# Patient Record
Sex: FEMALE | Race: WHITE | NOT HISPANIC OR LATINO | URBAN - METROPOLITAN AREA
[De-identification: names, ages, dates, MRNs, and addresses within clinical notes are randomized per-mention and may not be internally consistent; named-entity substitution may affect disease eponyms.]

---

## 2020-07-25 ENCOUNTER — TELEPHONE ENCOUNTER (OUTPATIENT)
Dept: URBAN - METROPOLITAN AREA CLINIC 13 | Facility: CLINIC | Age: 60
End: 2020-07-25

## 2020-07-26 ENCOUNTER — TELEPHONE ENCOUNTER (OUTPATIENT)
Dept: URBAN - METROPOLITAN AREA CLINIC 13 | Facility: CLINIC | Age: 60
End: 2020-07-26

## 2020-07-26 RX ORDER — CHLORDIAZEPOXIDE HYDROCHLORIDE 5 MG/1
TAKE FOUR 2.5 MG TABLETS DAILY CAPSULE ORAL
Refills: 0 | Status: ACTIVE | COMMUNITY

## 2020-07-26 RX ORDER — TRAZODONE HYDROCHLORIDE 50 MG/1
TAKE 1 TABLET DAILY TABLET ORAL
Qty: 30 | Refills: 1 | Status: ACTIVE | COMMUNITY

## 2020-07-26 RX ORDER — ALPRAZOLAM 1 MG
TAKE 2 TABLET BEDTIME TABLET ORAL
Qty: 60 | Refills: 0 | Status: ACTIVE | COMMUNITY

## 2020-07-26 RX ORDER — AMLODIPINE BESYLATE 5 MG
TAKE 1 TABLET DAILY TABLET ORAL
Refills: 0 | Status: ACTIVE | COMMUNITY

## 2020-07-26 RX ORDER — FLUOXETINE HCL 20 MG
CAPSULE ORAL
Refills: 0 | Status: ACTIVE | COMMUNITY

## 2020-07-26 RX ORDER — DIPHENOXYLATE HCL/ATROPINE 2.5-.025MG
TABLET ORAL
Refills: 0 | Status: ACTIVE | COMMUNITY

## 2020-07-26 RX ORDER — BUSPIRONE HYDROCHLORIDE 10 MG/1
TAKE 1 TABLET BY MOUTH ONCE DAILY TABLET ORAL
Qty: 30 | Refills: 1 | Status: ACTIVE | COMMUNITY

## 2021-07-16 ENCOUNTER — WEB ENCOUNTER (OUTPATIENT)
Dept: URBAN - METROPOLITAN AREA CLINIC 113 | Facility: CLINIC | Age: 61
End: 2021-07-16

## 2021-07-16 ENCOUNTER — OFFICE VISIT (OUTPATIENT)
Dept: URBAN - METROPOLITAN AREA CLINIC 113 | Facility: CLINIC | Age: 61
End: 2021-07-16
Payer: COMMERCIAL

## 2021-07-16 VITALS
WEIGHT: 89 LBS | SYSTOLIC BLOOD PRESSURE: 112 MMHG | HEIGHT: 60 IN | HEART RATE: 108 BPM | TEMPERATURE: 97.7 F | BODY MASS INDEX: 17.47 KG/M2 | DIASTOLIC BLOOD PRESSURE: 79 MMHG

## 2021-07-16 DIAGNOSIS — R10.13 EPIGASTRIC ABDOMINAL PAIN: ICD-10-CM

## 2021-07-16 DIAGNOSIS — K59.09 CHRONIC CONSTIPATION: ICD-10-CM

## 2021-07-16 DIAGNOSIS — R93.5 ABNORMAL ABDOMINAL CT SCAN: ICD-10-CM

## 2021-07-16 PROCEDURE — 99203 OFFICE O/P NEW LOW 30 MIN: CPT | Performed by: INTERNAL MEDICINE

## 2021-07-16 RX ORDER — ALPRAZOLAM 1 MG
TAKE 2 TABLET BEDTIME TABLET ORAL
Qty: 60 | Refills: 0 | Status: ON HOLD | COMMUNITY

## 2021-07-16 RX ORDER — BUSPIRONE HYDROCHLORIDE 10 MG/1
TAKE 1 TABLET BY MOUTH ONCE DAILY TABLET ORAL
Qty: 30 | Refills: 1 | Status: ON HOLD | COMMUNITY

## 2021-07-16 RX ORDER — FLUOXETINE HCL 20 MG
CAPSULE ORAL
Refills: 0 | Status: ON HOLD | COMMUNITY

## 2021-07-16 RX ORDER — AMLODIPINE BESYLATE 5 MG
TAKE 1 TABLET DAILY TABLET ORAL
Refills: 0 | Status: ON HOLD | COMMUNITY

## 2021-07-16 RX ORDER — CHLORDIAZEPOXIDE HYDROCHLORIDE AND CLIDINIUM BROMIDE 5; 2.5 MG/1; MG/1
1 CAPSULE BEFORE MEALS CAPSULE ORAL THREE TIMES A DAY
Status: ACTIVE | COMMUNITY

## 2021-07-16 RX ORDER — CHLORDIAZEPOXIDE HYDROCHLORIDE 5 MG/1
TAKE FOUR 2.5 MG TABLETS DAILY CAPSULE ORAL
Refills: 0 | Status: ON HOLD | COMMUNITY

## 2021-07-16 RX ORDER — DIPHENOXYLATE HCL/ATROPINE 2.5-.025MG
TABLET ORAL
Refills: 0 | Status: ACTIVE | COMMUNITY

## 2021-07-16 RX ORDER — TRAZODONE HYDROCHLORIDE 50 MG/1
TAKE 1 TABLET DAILY TABLET ORAL
Qty: 30 | Refills: 1 | Status: ON HOLD | COMMUNITY

## 2021-07-16 NOTE — HPI-TODAY'S VISIT:
61-year-old female with a history of hypertension, anxiety and depression, chronic diarrhea with fecal incontinence, referred by Dr. Joshua Mondragon for evaluation of abdominal pain, abnormal CT findings. She was last seen 6/11/2018 for follow-up after colonoscopy, with complaint of alternating bowel habits suspected to be related to constipation, aggravated by chronic Lomotil use.  She was instructed to discontinue the antidiarrheal and begin a daily fiber supplement. Screening colonoscopy 5/25/2018 was normal. Last Saturday, 6 days ago, she experienced the sudden onset of epigastric pain, which persisted for several days. She describes the discomfort as "constant," but cannot otherwise characterize the pain. Three days ago, she was seen in the ED at [facility] at the urging of her PCP. Imaging at that time reportedly showed constipation, and her "stomach not emptying," per the patient. The referral suggests thickening of the pylorus, but unfortunately imaging results are not available for review. No intervention was taken. She was encouraged a bland diet, and referred to our office. Yesterday, she has 4-5 loose stools and the abdominal pain subsided. She denies nausea or vomiting. Interestingly, she takes Lomotil two tablets twice daily and only has 1-2 bowel movements per week. She denies blood per rectum. She takes Librax as needed, which is helpful. She has unintentionally lose 10lb in the last week.

## 2021-07-19 ENCOUNTER — LAB OUTSIDE AN ENCOUNTER (OUTPATIENT)
Dept: URBAN - METROPOLITAN AREA CLINIC 113 | Facility: CLINIC | Age: 61
End: 2021-07-19

## 2021-07-19 ENCOUNTER — TELEPHONE ENCOUNTER (OUTPATIENT)
Dept: URBAN - METROPOLITAN AREA CLINIC 113 | Facility: CLINIC | Age: 61
End: 2021-07-19

## 2021-08-04 ENCOUNTER — TELEPHONE ENCOUNTER (OUTPATIENT)
Dept: URBAN - METROPOLITAN AREA CLINIC 113 | Facility: CLINIC | Age: 61
End: 2021-08-04

## 2021-08-10 ENCOUNTER — OFFICE VISIT (OUTPATIENT)
Dept: URBAN - METROPOLITAN AREA SURGERY CENTER 25 | Facility: SURGERY CENTER | Age: 61
End: 2021-08-10
Payer: COMMERCIAL

## 2021-08-10 DIAGNOSIS — K29.50 ANTRAL GASTRITIS: ICD-10-CM

## 2021-08-10 DIAGNOSIS — K22.8 OTHER SPECIFIED DISEASES OF ESOPHAGUS: ICD-10-CM

## 2021-08-10 DIAGNOSIS — K31.89 GASTRIC FOVEOLAR HYPERPLASIA: ICD-10-CM

## 2021-08-10 PROCEDURE — G8907 PT DOC NO EVENTS ON DISCHARG: HCPCS | Performed by: INTERNAL MEDICINE

## 2021-08-10 PROCEDURE — 43239 EGD BIOPSY SINGLE/MULTIPLE: CPT | Performed by: INTERNAL MEDICINE

## 2021-08-10 RX ORDER — AMLODIPINE BESYLATE 5 MG
TAKE 1 TABLET DAILY TABLET ORAL
Refills: 0 | Status: ON HOLD | COMMUNITY

## 2021-08-10 RX ORDER — CHLORDIAZEPOXIDE HYDROCHLORIDE 5 MG/1
TAKE FOUR 2.5 MG TABLETS DAILY CAPSULE ORAL
Refills: 0 | Status: ON HOLD | COMMUNITY

## 2021-08-10 RX ORDER — DIPHENOXYLATE HCL/ATROPINE 2.5-.025MG
TABLET ORAL
Refills: 0 | Status: ACTIVE | COMMUNITY

## 2021-08-10 RX ORDER — TRAZODONE HYDROCHLORIDE 50 MG/1
TAKE 1 TABLET DAILY TABLET ORAL
Qty: 30 | Refills: 1 | Status: ON HOLD | COMMUNITY

## 2021-08-10 RX ORDER — CHLORDIAZEPOXIDE HYDROCHLORIDE AND CLIDINIUM BROMIDE 5; 2.5 MG/1; MG/1
1 CAPSULE BEFORE MEALS CAPSULE ORAL THREE TIMES A DAY
Status: ACTIVE | COMMUNITY

## 2021-08-10 RX ORDER — ALPRAZOLAM 1 MG
TAKE 2 TABLET BEDTIME TABLET ORAL
Qty: 60 | Refills: 0 | Status: ON HOLD | COMMUNITY

## 2021-08-10 RX ORDER — FLUOXETINE HCL 20 MG
CAPSULE ORAL
Refills: 0 | Status: ON HOLD | COMMUNITY

## 2021-08-10 RX ORDER — BUSPIRONE HYDROCHLORIDE 10 MG/1
TAKE 1 TABLET BY MOUTH ONCE DAILY TABLET ORAL
Qty: 30 | Refills: 1 | Status: ON HOLD | COMMUNITY

## 2021-08-17 ENCOUNTER — TELEPHONE ENCOUNTER (OUTPATIENT)
Dept: URBAN - METROPOLITAN AREA CLINIC 113 | Facility: CLINIC | Age: 61
End: 2021-08-17

## 2021-08-17 RX ORDER — PANTOPRAZOLE SODIUM 40 MG/1
1 TABLET TABLET, DELAYED RELEASE ORAL ONCE A DAY
Qty: 30 | OUTPATIENT
Start: 2021-08-25

## 2021-09-27 ENCOUNTER — OFFICE VISIT (OUTPATIENT)
Dept: URBAN - METROPOLITAN AREA CLINIC 113 | Facility: CLINIC | Age: 61
End: 2021-09-27
Payer: COMMERCIAL

## 2021-09-27 ENCOUNTER — DASHBOARD ENCOUNTERS (OUTPATIENT)
Age: 61
End: 2021-09-27

## 2021-09-27 VITALS
DIASTOLIC BLOOD PRESSURE: 100 MMHG | HEART RATE: 89 BPM | TEMPERATURE: 98.1 F | SYSTOLIC BLOOD PRESSURE: 165 MMHG | BODY MASS INDEX: 16.88 KG/M2 | WEIGHT: 86 LBS | RESPIRATION RATE: 18 BRPM | HEIGHT: 60 IN

## 2021-09-27 DIAGNOSIS — R10.13 EPIGASTRIC ABDOMINAL PAIN: ICD-10-CM

## 2021-09-27 DIAGNOSIS — K31.89 INTESTINAL METAPLASIA OF GASTRIC MUCOSA: ICD-10-CM

## 2021-09-27 DIAGNOSIS — R63.4 WEIGHT LOSS: ICD-10-CM

## 2021-09-27 DIAGNOSIS — K52.9 CHRONIC DIARRHEA: ICD-10-CM

## 2021-09-27 DIAGNOSIS — K59.01 SLOW TRANSIT CONSTIPATION: ICD-10-CM

## 2021-09-27 PROBLEM — 35298007 SLOW TRANSIT CONSTIPATION: Status: ACTIVE | Noted: 2021-07-16

## 2021-09-27 PROBLEM — 89362005: Status: ACTIVE | Noted: 2021-09-27

## 2021-09-27 PROBLEM — 72519002: Status: ACTIVE | Noted: 2021-09-27

## 2021-09-27 PROBLEM — 236071009: Status: ACTIVE | Noted: 2021-09-27

## 2021-09-27 PROBLEM — 79922009 EPIGASTRIC PAIN: Status: ACTIVE | Noted: 2021-09-27

## 2021-09-27 PROCEDURE — 99213 OFFICE O/P EST LOW 20 MIN: CPT | Performed by: INTERNAL MEDICINE

## 2021-09-27 RX ORDER — AMLODIPINE BESYLATE 5 MG
1 TABLET TABLET ORAL ONCE A DAY
Refills: 0 | Status: DISCONTINUED | COMMUNITY

## 2021-09-27 RX ORDER — CHLORDIAZEPOXIDE HYDROCHLORIDE AND CLIDINIUM BROMIDE 5; 2.5 MG/1; MG/1
1 CAPSULE BEFORE MEALS CAPSULE ORAL
Status: DISCONTINUED | COMMUNITY

## 2021-09-27 RX ORDER — TRAZODONE HYDROCHLORIDE 50 MG/1
TAKE 1 TABLET DAILY TABLET ORAL
Qty: 30 | Refills: 1 | Status: DISCONTINUED | COMMUNITY

## 2021-09-27 RX ORDER — CHLORDIAZEPOXIDE HYDROCHLORIDE 5 MG/1
TAKE FOUR 2.5 MG TABLETS DAILY CAPSULE ORAL
Refills: 0 | Status: DISCONTINUED | COMMUNITY

## 2021-09-27 RX ORDER — DIPHENOXYLATE HCL/ATROPINE 2.5-.025MG
1 TABLET AS NEEDED TABLET ORAL
Refills: 0 | Status: ACTIVE | COMMUNITY

## 2021-09-27 RX ORDER — BUSPIRONE HYDROCHLORIDE 10 MG/1
TAKE 1 TABLET BY MOUTH ONCE DAILY TABLET ORAL
Qty: 30 | Refills: 1 | Status: DISCONTINUED | COMMUNITY

## 2021-09-27 RX ORDER — ALPRAZOLAM 1 MG
TAKE 2 TABLET BEDTIME TABLET ORAL
Qty: 60 | Refills: 0 | Status: DISCONTINUED | COMMUNITY

## 2021-09-27 RX ORDER — FLUOXETINE HCL 20 MG
1 CAPSULE CAPSULE ORAL ONCE A DAY
Refills: 0 | Status: DISCONTINUED | COMMUNITY

## 2021-09-27 RX ORDER — PANTOPRAZOLE SODIUM 40 MG/1
1 TABLET TABLET, DELAYED RELEASE ORAL ONCE A DAY
Qty: 30 | Status: ON HOLD | COMMUNITY
Start: 2021-08-25

## 2021-09-27 NOTE — HPI-TODAY'S VISIT:
This is a 61-year-old female with a history of hypertension, anxiety and depression, chronic diarrhea with fecal incontinence, abdominal pain, and an abnormal CT scan demonstrating a possible small bowel obstruction or abnormality within the stomach presenting for follow-up. She was initially seen 7/16/2021.  6 days prior to her visit, she experienced sudden onset epigastric pain which persisted for several days.  She had been to the emergency department at [facility] reporting that imaging showed constipation and that her stomach was not emptying.  The referral information suggested thickening of the pylorus.  She had 4 or 5 loose stools a day prior to her visit and her abdominal pain had resolved.  She reported that she took Lomotil 2 tablets twice a day and had 1 or 2 bowel movements per week.  She was taking Librax as needed.  She reported a 10 pound unintentional weight loss.  It was suspected that underlying constipation and long-term Lomotil use was playing a role with her symptoms.  She was instructed to discontinue Lomotil and use MiraLAX as needed.  CT report was requested.  She was later scheduled for an EGD.  She has been taking pantoprazole 40 mg daily.  She exhausted her supply 2 days ago.  She reports difficulty sleeping, nightmares, worsening headaches, and describes hot flashes occurring recently.  She attributes this to medication side effect from pantoprazole.  She denies dysphagia, regurgitation, heartburn, or abdominal pain.  She reports a good appetite.  She is concerned regarding persistent weight loss.  She was prescribed Megace in the past which was somewhat helpful.  She did not start MiraLAX and continues to take Lomotil.  She states she stopped Lomotil recently upon the recommendation of a nurse practitioner at her primary care provider's office and had diarrhea with fecal incontinence.  She is taking Lomotil 2 in the morning and 2 at lunch.  She has a bowel movement every day.  Her stools are firm.  She denies straining.  She denies red blood per rectum or melena.  She denies any other abdominal symptoms.  She reports that her blood pressure medication was discontinued months ago because her blood pressure was lower than normal.  She has lost 3 pounds since her last visit in July per our records.

## 2021-09-27 NOTE — HPI-OTHER HISTORIES
EGD 8/10/2021:Irregular Z-line, gastritis status post biopsy, normal examined duodenum.  Pathology: Stomach body and antral biopsies demonstrated mild chronic gastritis, mild foveolar hyperplasia and intestinal metaplasia negative for H. pylori. CT of the abdomen and pelvis with contrast 7/13/2021:Nonspecific mildly thick-walled appearance of the pylorus.  Normal appendix.  Extensive stool throughout the colon. Colonoscopy 5/25/2018:Normal.

## 2022-10-19 ENCOUNTER — ERX REFILL RESPONSE (OUTPATIENT)
Dept: URBAN - METROPOLITAN AREA CLINIC 107 | Facility: CLINIC | Age: 62
End: 2022-10-19

## 2022-10-19 RX ORDER — PANTOPRAZOLE SODIUM 40 MG/1
TAKE 1 TABLET BY MOUTH ONCE DAILY TABLET, DELAYED RELEASE ORAL
Qty: 30 TABLET | Refills: 0 | OUTPATIENT

## 2022-10-19 RX ORDER — PANTOPRAZOLE SODIUM 40 MG/1
1 TABLET TABLET, DELAYED RELEASE ORAL ONCE A DAY
Qty: 30 | OUTPATIENT

## 2023-01-04 ENCOUNTER — ERX REFILL RESPONSE (OUTPATIENT)
Dept: URBAN - METROPOLITAN AREA CLINIC 107 | Facility: CLINIC | Age: 63
End: 2023-01-04

## 2023-01-04 RX ORDER — PANTOPRAZOLE SODIUM 40 MG/1
TAKE 1 TABLET BY MOUTH ONCE DAILY TABLET, DELAYED RELEASE ORAL
Qty: 30 TABLET | Refills: 1 | OUTPATIENT

## 2023-01-04 RX ORDER — PANTOPRAZOLE SODIUM 40 MG/1
TAKE 1 TABLET BY MOUTH ONCE DAILY TABLET, DELAYED RELEASE ORAL
Qty: 30 TABLET | Refills: 0 | OUTPATIENT